# Patient Record
Sex: MALE | Race: WHITE | NOT HISPANIC OR LATINO | Employment: OTHER | URBAN - METROPOLITAN AREA
[De-identification: names, ages, dates, MRNs, and addresses within clinical notes are randomized per-mention and may not be internally consistent; named-entity substitution may affect disease eponyms.]

---

## 2020-10-30 PROBLEM — R97.20 ELEVATED PSA: Status: ACTIVE | Noted: 2020-08-27

## 2021-01-14 ENCOUNTER — ANESTHESIA EVENT (OUTPATIENT)
Dept: PERIOP | Facility: HOSPITAL | Age: 66
End: 2021-01-14
Payer: MEDICARE

## 2021-01-14 DIAGNOSIS — M16.11 PRIMARY OSTEOARTHRITIS OF RIGHT HIP: Primary | ICD-10-CM

## 2021-01-15 DIAGNOSIS — Z11.59 SCREENING FOR VIRAL DISEASE: Primary | ICD-10-CM

## 2021-01-15 DIAGNOSIS — M16.11 PRIMARY OSTEOARTHRITIS OF RIGHT HIP: ICD-10-CM

## 2021-01-15 DIAGNOSIS — Z11.59 SCREENING FOR VIRAL DISEASE: ICD-10-CM

## 2021-01-15 PROCEDURE — U0005 INFEC AGEN DETEC AMPLI PROBE: HCPCS | Performed by: ORTHOPAEDIC SURGERY

## 2021-01-15 PROCEDURE — U0003 INFECTIOUS AGENT DETECTION BY NUCLEIC ACID (DNA OR RNA); SEVERE ACUTE RESPIRATORY SYNDROME CORONAVIRUS 2 (SARS-COV-2) (CORONAVIRUS DISEASE [COVID-19]), AMPLIFIED PROBE TECHNIQUE, MAKING USE OF HIGH THROUGHPUT TECHNOLOGIES AS DESCRIBED BY CMS-2020-01-R: HCPCS | Performed by: ORTHOPAEDIC SURGERY

## 2021-01-15 RX ORDER — LANOLIN ALCOHOL/MO/W.PET/CERES
1 CREAM (GRAM) TOPICAL 2 TIMES DAILY
COMMUNITY
End: 2021-01-21 | Stop reason: HOSPADM

## 2021-01-15 NOTE — PRE-PROCEDURE INSTRUCTIONS
Pre-Surgery Instructions:   Medication Instructions    glucosamine-chondroitin 500-400 MG tablet Instructed patient per Anesthesia Guidelines  Instructed to stop Glucosamine and no aspirin or NSAIDs before surgery starting now  Has no medications to take the morning of surgery

## 2021-01-16 LAB — SARS-COV-2 RNA SPEC QL NAA+PROBE: NOT DETECTED

## 2021-01-20 ENCOUNTER — APPOINTMENT (OUTPATIENT)
Dept: RADIOLOGY | Facility: HOSPITAL | Age: 66
End: 2021-01-20
Payer: MEDICARE

## 2021-01-20 ENCOUNTER — ANESTHESIA (OUTPATIENT)
Dept: PERIOP | Facility: HOSPITAL | Age: 66
End: 2021-01-20
Payer: MEDICARE

## 2021-01-20 ENCOUNTER — HOSPITAL ENCOUNTER (OUTPATIENT)
Facility: HOSPITAL | Age: 66
Setting detail: OUTPATIENT SURGERY
Discharge: HOME HEALTH | End: 2021-01-21
Attending: ORTHOPAEDIC SURGERY | Admitting: ORTHOPAEDIC SURGERY
Payer: MEDICARE

## 2021-01-20 ENCOUNTER — HOSPITAL ENCOUNTER (OUTPATIENT)
Dept: RADIOLOGY | Facility: HOSPITAL | Age: 66
Setting detail: OUTPATIENT SURGERY
Discharge: HOME | End: 2021-01-20
Payer: MEDICARE

## 2021-01-20 VITALS — HEART RATE: 77 BPM

## 2021-01-20 DIAGNOSIS — T83.091A COMPLICATION, BLOCKED FOLEY CATHETER, INITIAL ENCOUNTER (HCC): ICD-10-CM

## 2021-01-20 DIAGNOSIS — M16.11 PRIMARY OSTEOARTHRITIS OF RIGHT HIP: Primary | ICD-10-CM

## 2021-01-20 DIAGNOSIS — M16.11 PRIMARY OSTEOARTHRITIS OF RIGHT HIP: ICD-10-CM

## 2021-01-20 PROBLEM — E66.9 OBESITY (BMI 30.0-34.9): Status: ACTIVE | Noted: 2021-01-20

## 2021-01-20 LAB
ABO GROUP BLD: NORMAL
ABO GROUP BLD: NORMAL
BLD GP AB SCN SERPL QL: NEGATIVE
RH BLD: POSITIVE
RH BLD: POSITIVE
SPECIMEN EXPIRATION DATE: NORMAL

## 2021-01-20 PROCEDURE — 86901 BLOOD TYPING SEROLOGIC RH(D): CPT | Performed by: ORTHOPAEDIC SURGERY

## 2021-01-20 PROCEDURE — C1776 JOINT DEVICE (IMPLANTABLE): HCPCS | Performed by: ORTHOPAEDIC SURGERY

## 2021-01-20 PROCEDURE — 73501 X-RAY EXAM HIP UNI 1 VIEW: CPT

## 2021-01-20 PROCEDURE — 86900 BLOOD TYPING SEROLOGIC ABO: CPT | Performed by: ORTHOPAEDIC SURGERY

## 2021-01-20 PROCEDURE — 51702 INSERT TEMP BLADDER CATH: CPT | Performed by: UROLOGY

## 2021-01-20 PROCEDURE — 99203 OFFICE O/P NEW LOW 30 MIN: CPT | Performed by: UROLOGY

## 2021-01-20 PROCEDURE — 86850 RBC ANTIBODY SCREEN: CPT | Performed by: ORTHOPAEDIC SURGERY

## 2021-01-20 DEVICE — PINNACLE POROCOAT ACETABULAR SHELL SECTOR II 58MM OD
Type: IMPLANTABLE DEVICE | Site: HIP | Status: FUNCTIONAL
Brand: PINNACLE POROCOAT

## 2021-01-20 DEVICE — PINNACLE HIP SOLUTIONS ALTRX POLYETHYLENE ACETABULAR LINER NEUTRAL 36MM ID 58MM OD
Type: IMPLANTABLE DEVICE | Site: HIP | Status: FUNCTIONAL
Brand: PINNACLE ALTRX

## 2021-01-20 DEVICE — BIOLOX DELTA CERAMIC FEMORAL HEAD +5.0 36MM DIA 12/14 TAPER
Type: IMPLANTABLE DEVICE | Site: HIP | Status: FUNCTIONAL
Brand: BIOLOX DELTA

## 2021-01-20 DEVICE — CORAIL HIP SYSTEM CEMENTLESS FEMORAL STEM HA COATED 12/14 AMT 135 DEGREES HIGH OFFSET COLLAR SIZE 14
Type: IMPLANTABLE DEVICE | Site: HIP | Status: FUNCTIONAL
Brand: CORAIL

## 2021-01-20 RX ORDER — OXYCODONE HYDROCHLORIDE 10 MG/1
10 TABLET ORAL EVERY 6 HOURS PRN
Status: DISCONTINUED | OUTPATIENT
Start: 2021-01-20 | End: 2021-01-21 | Stop reason: HOSPADM

## 2021-01-20 RX ORDER — ONDANSETRON 2 MG/ML
4 INJECTION INTRAMUSCULAR; INTRAVENOUS ONCE AS NEEDED
Status: DISCONTINUED | OUTPATIENT
Start: 2021-01-20 | End: 2021-01-20 | Stop reason: HOSPADM

## 2021-01-20 RX ORDER — DOCUSATE SODIUM 100 MG/1
100 CAPSULE, LIQUID FILLED ORAL 2 TIMES DAILY
Status: DISCONTINUED | OUTPATIENT
Start: 2021-01-20 | End: 2021-01-21 | Stop reason: HOSPADM

## 2021-01-20 RX ORDER — CEFAZOLIN SODIUM 1 G/50ML
1000 SOLUTION INTRAVENOUS EVERY 8 HOURS
Status: COMPLETED | OUTPATIENT
Start: 2021-01-20 | End: 2021-01-21

## 2021-01-20 RX ORDER — MIDAZOLAM HYDROCHLORIDE 2 MG/2ML
INJECTION, SOLUTION INTRAMUSCULAR; INTRAVENOUS AS NEEDED
Status: DISCONTINUED | OUTPATIENT
Start: 2021-01-20 | End: 2021-01-20

## 2021-01-20 RX ORDER — SODIUM CHLORIDE 9 MG/ML
100 INJECTION, SOLUTION INTRAVENOUS CONTINUOUS
Status: DISCONTINUED | OUTPATIENT
Start: 2021-01-20 | End: 2021-01-21 | Stop reason: HOSPADM

## 2021-01-20 RX ORDER — ASPIRIN 325 MG
325 TABLET ORAL 2 TIMES DAILY
Status: DISCONTINUED | OUTPATIENT
Start: 2021-01-20 | End: 2021-01-21 | Stop reason: HOSPADM

## 2021-01-20 RX ORDER — GLYCOPYRROLATE 0.2 MG/ML
INJECTION INTRAMUSCULAR; INTRAVENOUS AS NEEDED
Status: DISCONTINUED | OUTPATIENT
Start: 2021-01-20 | End: 2021-01-20

## 2021-01-20 RX ORDER — TRANEXAMIC ACID 100 MG/ML
INJECTION, SOLUTION INTRAVENOUS AS NEEDED
Status: DISCONTINUED | OUTPATIENT
Start: 2021-01-20 | End: 2021-01-20

## 2021-01-20 RX ORDER — EPHEDRINE SULFATE 50 MG/ML
INJECTION INTRAVENOUS AS NEEDED
Status: DISCONTINUED | OUTPATIENT
Start: 2021-01-20 | End: 2021-01-20

## 2021-01-20 RX ORDER — FENTANYL CITRATE 50 UG/ML
INJECTION, SOLUTION INTRAMUSCULAR; INTRAVENOUS AS NEEDED
Status: DISCONTINUED | OUTPATIENT
Start: 2021-01-20 | End: 2021-01-20

## 2021-01-20 RX ORDER — CEFAZOLIN SODIUM 1 G/50ML
1000 SOLUTION INTRAVENOUS ONCE
Status: COMPLETED | OUTPATIENT
Start: 2021-01-20 | End: 2021-01-20

## 2021-01-20 RX ORDER — FERROUS SULFATE 325(65) MG
325 TABLET ORAL
Status: DISCONTINUED | OUTPATIENT
Start: 2021-01-21 | End: 2021-01-21 | Stop reason: HOSPADM

## 2021-01-20 RX ORDER — BUPIVACAINE HYDROCHLORIDE 7.5 MG/ML
INJECTION, SOLUTION INTRASPINAL AS NEEDED
Status: DISCONTINUED | OUTPATIENT
Start: 2021-01-20 | End: 2021-01-20

## 2021-01-20 RX ORDER — DEXAMETHASONE SODIUM PHOSPHATE 4 MG/ML
INJECTION, SOLUTION INTRA-ARTICULAR; INTRALESIONAL; INTRAMUSCULAR; INTRAVENOUS; SOFT TISSUE AS NEEDED
Status: DISCONTINUED | OUTPATIENT
Start: 2021-01-20 | End: 2021-01-20

## 2021-01-20 RX ORDER — MAGNESIUM HYDROXIDE 1200 MG/15ML
LIQUID ORAL AS NEEDED
Status: DISCONTINUED | OUTPATIENT
Start: 2021-01-20 | End: 2021-01-20 | Stop reason: HOSPADM

## 2021-01-20 RX ORDER — OXYCODONE HYDROCHLORIDE 5 MG/1
5 TABLET ORAL EVERY 4 HOURS PRN
Status: DISCONTINUED | OUTPATIENT
Start: 2021-01-20 | End: 2021-01-21 | Stop reason: HOSPADM

## 2021-01-20 RX ORDER — ONDANSETRON 2 MG/ML
4 INJECTION INTRAMUSCULAR; INTRAVENOUS EVERY 6 HOURS PRN
Status: DISCONTINUED | OUTPATIENT
Start: 2021-01-20 | End: 2021-01-21 | Stop reason: HOSPADM

## 2021-01-20 RX ORDER — ACETAMINOPHEN 325 MG/1
650 TABLET ORAL EVERY 6 HOURS PRN
Status: DISCONTINUED | OUTPATIENT
Start: 2021-01-20 | End: 2021-01-21 | Stop reason: HOSPADM

## 2021-01-20 RX ORDER — SODIUM CHLORIDE, SODIUM LACTATE, POTASSIUM CHLORIDE, CALCIUM CHLORIDE 600; 310; 30; 20 MG/100ML; MG/100ML; MG/100ML; MG/100ML
100 INJECTION, SOLUTION INTRAVENOUS CONTINUOUS
Status: DISCONTINUED | OUTPATIENT
Start: 2021-01-20 | End: 2021-01-21 | Stop reason: HOSPADM

## 2021-01-20 RX ORDER — KETOROLAC TROMETHAMINE 30 MG/ML
15 INJECTION, SOLUTION INTRAMUSCULAR; INTRAVENOUS EVERY 6 HOURS PRN
Status: DISCONTINUED | OUTPATIENT
Start: 2021-01-20 | End: 2021-01-21 | Stop reason: HOSPADM

## 2021-01-20 RX ORDER — ONDANSETRON 2 MG/ML
INJECTION INTRAMUSCULAR; INTRAVENOUS AS NEEDED
Status: DISCONTINUED | OUTPATIENT
Start: 2021-01-20 | End: 2021-01-20

## 2021-01-20 RX ORDER — PROPOFOL 10 MG/ML
INJECTION, EMULSION INTRAVENOUS CONTINUOUS PRN
Status: DISCONTINUED | OUTPATIENT
Start: 2021-01-20 | End: 2021-01-20

## 2021-01-20 RX ORDER — FENTANYL CITRATE/PF 50 MCG/ML
25 SYRINGE (ML) INJECTION
Status: DISCONTINUED | OUTPATIENT
Start: 2021-01-20 | End: 2021-01-20 | Stop reason: HOSPADM

## 2021-01-20 RX ORDER — METOCLOPRAMIDE HYDROCHLORIDE 5 MG/ML
INJECTION INTRAMUSCULAR; INTRAVENOUS AS NEEDED
Status: DISCONTINUED | OUTPATIENT
Start: 2021-01-20 | End: 2021-01-20

## 2021-01-20 RX ORDER — SODIUM CHLORIDE, SODIUM LACTATE, POTASSIUM CHLORIDE, CALCIUM CHLORIDE 600; 310; 30; 20 MG/100ML; MG/100ML; MG/100ML; MG/100ML
50 INJECTION, SOLUTION INTRAVENOUS CONTINUOUS
Status: DISCONTINUED | OUTPATIENT
Start: 2021-01-20 | End: 2021-01-21 | Stop reason: HOSPADM

## 2021-01-20 RX ORDER — LIDOCAINE HYDROCHLORIDE 10 MG/ML
0.5 INJECTION, SOLUTION EPIDURAL; INFILTRATION; INTRACAUDAL; PERINEURAL ONCE AS NEEDED
Status: DISCONTINUED | OUTPATIENT
Start: 2021-01-20 | End: 2021-01-20 | Stop reason: HOSPADM

## 2021-01-20 RX ORDER — SENNOSIDES 8.6 MG
1 TABLET ORAL DAILY
Status: DISCONTINUED | OUTPATIENT
Start: 2021-01-21 | End: 2021-01-21 | Stop reason: HOSPADM

## 2021-01-20 RX ADMIN — MIDAZOLAM 2 MG: 1 INJECTION INTRAMUSCULAR; INTRAVENOUS at 13:04

## 2021-01-20 RX ADMIN — SODIUM CHLORIDE: 0.9 INJECTION, SOLUTION INTRAVENOUS at 13:45

## 2021-01-20 RX ADMIN — EPHEDRINE SULFATE 5 MG: 50 INJECTION, SOLUTION INTRAVENOUS at 15:03

## 2021-01-20 RX ADMIN — ASPIRIN 325 MG ORAL TABLET 325 MG: 325 PILL ORAL at 20:31

## 2021-01-20 RX ADMIN — FENTANYL CITRATE 100 MCG: 50 INJECTION, SOLUTION INTRAMUSCULAR; INTRAVENOUS at 13:04

## 2021-01-20 RX ADMIN — DEXAMETHASONE SODIUM PHOSPHATE 4 MG: 4 INJECTION INTRA-ARTICULAR; INTRALESIONAL; INTRAMUSCULAR; INTRAVENOUS; SOFT TISSUE at 13:58

## 2021-01-20 RX ADMIN — SODIUM CHLORIDE 100 ML/HR: 0.9 INJECTION, SOLUTION INTRAVENOUS at 10:59

## 2021-01-20 RX ADMIN — TRANEXAMIC ACID 1000 MG: 1 INJECTION, SOLUTION INTRAVENOUS at 13:26

## 2021-01-20 RX ADMIN — SODIUM CHLORIDE, SODIUM LACTATE, POTASSIUM CHLORIDE, AND CALCIUM CHLORIDE 100 ML/HR: .6; .31; .03; .02 INJECTION, SOLUTION INTRAVENOUS at 23:47

## 2021-01-20 RX ADMIN — SODIUM CHLORIDE, SODIUM LACTATE, POTASSIUM CHLORIDE, AND CALCIUM CHLORIDE 100 ML/HR: .6; .31; .03; .02 INJECTION, SOLUTION INTRAVENOUS at 21:00

## 2021-01-20 RX ADMIN — CEFAZOLIN SODIUM 1000 MG: 1 SOLUTION INTRAVENOUS at 22:31

## 2021-01-20 RX ADMIN — EPHEDRINE SULFATE 5 MG: 50 INJECTION, SOLUTION INTRAVENOUS at 13:55

## 2021-01-20 RX ADMIN — GLYCOPYRROLATE 0.2 MG: 0.2 INJECTION, SOLUTION INTRAMUSCULAR; INTRAVENOUS at 13:48

## 2021-01-20 RX ADMIN — BUPIVACAINE HYDROCHLORIDE IN DEXTROSE 2 ML: 7.5 INJECTION, SOLUTION SUBARACHNOID at 13:17

## 2021-01-20 RX ADMIN — CEFAZOLIN SODIUM 2000 MG: 1 SOLUTION INTRAVENOUS at 13:00

## 2021-01-20 RX ADMIN — METOCLOPRAMIDE 5 MG: 5 INJECTION, SOLUTION INTRAMUSCULAR; INTRAVENOUS at 13:48

## 2021-01-20 RX ADMIN — ONDANSETRON 4 MG: 2 INJECTION INTRAMUSCULAR; INTRAVENOUS at 14:59

## 2021-01-20 RX ADMIN — PROPOFOL 150 MCG/KG/MIN: 10 INJECTION, EMULSION INTRAVENOUS at 13:36

## 2021-01-20 RX ADMIN — EPHEDRINE SULFATE 10 MG: 50 INJECTION, SOLUTION INTRAVENOUS at 14:05

## 2021-01-20 RX ADMIN — EPHEDRINE SULFATE 10 MG: 50 INJECTION, SOLUTION INTRAVENOUS at 14:33

## 2021-01-20 RX ADMIN — EPHEDRINE SULFATE 5 MG: 50 INJECTION, SOLUTION INTRAVENOUS at 14:00

## 2021-01-20 NOTE — OP NOTE
Right Anterior Total Hip Procedure Note    Patient Name: Andrew Owen  MRN: [id number]  Date of Surgery 1/20/2021        Indications: Degenerative joint disease right hip with failed conservative care  Pre-operative Diagnosis: Right hip degenerative joint disease  Post-operative Diagnosis: Right hip degenerative joint disease  Surgeon: Fabienne Early MD [facility]    Assistant: Wali Calvillo [facility]    Anesthesia:  Procedure(s) and Anesthesia Type:     * ARTHROPLASTY HIP TOTAL ANTERIOR - Choice  Operative procedure: Right total hip arthroplasty, anterior approach    Implants:   Implant Name Type Inv  Item Serial No   Lot No  LRB No  Used Action   SHELL ACETABULAR 58MM POROUS SOLID LCK RING PINNACLE - [id number]  SHELL ACETABULAR 58MM POROUS SOLID LCK RING PINNACLE  DEPUY [id number] Right 1 Implanted   LINER ACTB ULT LNK PE 36 X 58MM 0DEG NEUTRAL ALTRX - [id number]  LINER ACTB ULT LNK PE 36 X 58MM 0DEG NEUTRAL ALTRX  DEPUY [id number] Right 1 Implanted   STEM FEMORAL SZ 14 HI COL CONRAIL AMT - [id number]  STEM FEMORAL SZ 14 HI COL CONRAIL AMT  DEPUY [id number] Right 1 Implanted   HEAD FEMORAL CMNTLS 12/14 TPR 36MM +5MM BIOLOX DELTA ARTICULEZE - [id number]  HEAD FEMORAL CMNTLS 12/14 TPR 36MM +5MM BIOLOX DELTA ARTICULEZE  DEPUY [id number] Right 1 Implanted     Ceramic head on Polyethelene, cementless    Drains: None  Estimated blood loss: 100cc    Antibiotics: Given preoperatively    Clinical note: Andrew Owen is a 77 y o  male who presents with severe right hip pain and disability  Physical exam investigations was consistent with degenerative joint disease  The patient been treated nonoperatively and failed to improve  Nonoperative versus operative options reviewed  The patient did understand the situation and did wish to proceed with operative management    Description of procedure: The patient was identified as Andrew Owen and the right hip as the surgical site    Anesthesia was administered  The patient was appropriately padded and placed on the [place] table for hip surgery  Utilizing a anterior approach, sharp dissection was carried down through skin  Hemostasis was obtained using electrocautery followed by division of the fascia overlying tensor fascia katlin  The tensor fascia katlin and abductors were retracted laterally  The lateral femoral circumflex vessels were identified and electrocoagulated  The hip capsule was identified and appropriate retractors were placed  A capsulotomy was performed and end-stage degenerative changes within the hip joint were confirmed  Under fluoroscopic control, a femoral neck cut was made and the femoral head and neck was removed  Loss of articular cartilage with osteophyte formation was again confirmed  Under direct vision as well as with the aid of fluoroscopic control, the acetabulum was serially reamed to a bleeding bony bed  Prominent osteophytes were removed  Under fluoroscopic control a [place] sector acetabulum was then impacted in place with approximately 40° of abduction and 20° of anteversion  A neutral polyethylene liner was then impacted into the acetabular shell with good fixation  Appropriate retractors were placed along the proximal femur and appropriate soft tissue releases were performed  The femur was elevated out of the wound to gain access to the femoral canal   The femoral canal was opened up with a box osteotome and rongeur followed by appropriate rasp and broaches  A trial reduction was carried out and the appropriate head and neck size was identified both under direct vision as well as with fluoroscopic imaging  The hip was noted to be stable with extreme positioning  The trial components were removed at the appropriate time after copious irrigation of the wound, the final femoral stem was impacted in place with excellent fixation    Trial reduction with various head sizes was carried out and the appropriate head size selected and impacted on the [facility] FOR CONTINUING [facility] CARE TABBY taper  The hip was reduced for the last time again demonstrating excellent range of motion stability with leg lengths being estimated to be near equal   Final imaging was obtained with the fluoroscopy and kept his hard copy  Hemostasis was inspected and found to be satisfactory followed by irrigation with pulse lavage and closure utilizing 2-0 Vicryl and stratafix for deep layers, 2-0 Vicryl  For subcutaneous closure and a 3 Monocryl subcutaneous stitch for skin  Steri-Strips were applied  A soft absorbent bandage was added and the patient was then transferred to the stretcher in the supine position  There were no complications  Throughout the procedure, assistance by Fifi Wang PA-C was required  She was required to aid in positioning the patient preoperatively and intraoperatively she was required to manipulate the patient's right lower extremity as well as various retractors and other equipment under my guidance  On completion of procedure, she was required to aid in closure of the wound and application of bandage  She was also required to aid in transfer the patient to recovery  AP hip, AP pelvis and and lateral views of the right hip were obtained intraoperatively  This demonstrated appropriate positioning a right total hip arthroplasty components  There was no evidence loosening or failure  There was air in the soft tissues consistent with intraoperative status the radiographs        Date: 1/20/2021  Time: 3:45 PM    Carmita Chew MD [facility]

## 2021-01-20 NOTE — ANESTHESIA PREPROCEDURE EVALUATION
Procedure:  ARTHROPLASTY HIP TOTAL ANTERIOR (Right Hip)    Relevant Problems   MUSCULOSKELETAL   (+) Primary localized osteoarthritis of right hip      Other   (+) Elevated PSA   (+) Obesity (BMI 30 0-34  9)        Physical Exam    Airway    Mallampati score: II  TM Distance: >3 FB  Neck ROM: full     Dental       Cardiovascular  Rhythm: regular, Rate: normal, Cardiovascular exam normal    Pulmonary  Pulmonary exam normal     Other Findings        Anesthesia Plan  ASA Score- 2     Anesthesia Type- spinal with ASA Monitors  Additional Monitors:   Airway Plan:           Plan Factors-    Chart reviewed  EKG reviewed  Existing labs reviewed  Patient summary reviewed  Patient is not a current smoker  Patient instructed to abstain from smoking on day of procedure  Patient did not smoke on day of surgery  Induction- intravenous  Postoperative Plan-     Informed Consent- Anesthetic plan and risks discussed with patient

## 2021-01-20 NOTE — INTERVAL H&P NOTE
H&P reviewed  After examining the patient I find no changes in the patients condition since the H&P had been written      Vitals:    01/20/21 1029   BP: 128/78   Pulse:    Resp:    Temp:    SpO2:

## 2021-01-20 NOTE — ANESTHESIA POSTPROCEDURE EVALUATION
Post-Op Assessment Note    CV Status:  Stable    Pain management: adequate     Mental Status:  Alert and awake   Hydration Status:  Euvolemic   PONV Controlled:  Controlled   Airway Patency:  Patent      Post Op Vitals Reviewed: Yes      Staff: Anesthesiologist         No complications documented      BP      Temp      Pulse    Resp      SpO2

## 2021-01-20 NOTE — CONSULTS
Consult - Urology   Hawk Span 1955, 77 y o  male MRN: [id number]    Unit/Bed#: OR POOL Encounter: [id number]  Assessment & Plan  POD#0 Right total hip arthroplasty  Fernando catheter placed intraoperatively around 1330 for no urine return, removed postop 1545 for still no UOP  Bladder scan current 94ml  Recommend Fernando catheter placement in light of spinal anesthesia, can remove tomorrow AM after anesthesia effects have worn off and he is OOB with PT  16Fr Fernando catheter placed at bedside in usual sterile fashion for clear yellow urine return about 300ml  Tolerated well, no procedural complications or altered anatomy encountered  Urology will sign off but remain available for any further inpatient needs  Please feel free to contact the provider [facility] with questions or concerns  Subjective: no complaints  No penile or bladder pain or fullness  Had spinal anesthesia so can't really feel anything  Denies prior  issues or catheterizations  Does follow with [facility] urology for elevated PSA few years, reports normal DREs, and record review shows a normal MRI last week- BPH without focal lesion, pirads 2  Review of Systems   Constitutional: Negative for activity change, appetite change, chills, fever and unexpected weight change  HENT: Negative  Respiratory: Negative  Negative for shortness of breath  Cardiovascular: Negative  Negative for chest pain  Gastrointestinal: Negative for abdominal pain, diarrhea, nausea and vomiting  Endocrine: Negative  Genitourinary: Negative for decreased urine volume, difficulty urinating, dysuria, flank pain, frequency, hematuria, penile pain, testicular pain and urgency  Musculoskeletal: Positive for gait problem (POD#0 THR)  Negative for back pain  Skin: Negative  Allergic/Immunologic: Negative  Hematological: Negative for adenopathy  Does not bruise/bleed easily  Objective:  Vitals: Blood pressure 116/64, pulse (!) 54, temperature 97 7 °F (36 5 °C), resp  rate 20, height 5' 11" (1 803 m), weight 108 kg (238 lb), SpO2 97 %  ,Body mass index is 33 19 kg/m²  Physical Exam  Vitals signs and nursing note reviewed  Constitutional:       General: He is not in acute distress  Appearance: He is well-developed  He is not ill-appearing  HENT:      Head: Normocephalic and atraumatic  Cardiovascular:      Rate and Rhythm: Normal rate and regular rhythm  Heart sounds: Normal heart sounds  No murmur  Pulmonary:      Effort: Pulmonary effort is normal       Breath sounds: Normal breath sounds  Abdominal:      General: Bowel sounds are normal  There is no distension  Palpations: Abdomen is soft  Tenderness: There is no abdominal tenderness  Genitourinary:     Comments: Circumcised penis, normal phallus, orthotopic patent meatus  Testes smooth descended bilaterally into the scrotum nontender with no palpable mass  Musculoskeletal: Normal range of motion  Skin:     General: Skin is warm and dry  Capillary Refill: Capillary refill takes less than 2 seconds  Coloration: Skin is not pale  Neurological:      Mental Status: He is alert and oriented to person, place, and time  Labs:  No results for input(s): WBC in the last 72 hours  No results for input(s): HGB in the last 72 hours  No results for input(s): CREATININE in the last 72 hours        History:    Past Medical History:   Diagnosis Date    Arthritis     b/l hips     R THR today 1/20/2021     Past Surgical History:   Procedure Laterality Date    COLONOSCOPY  11/2015    diverticulosis    COLONOSCOPY       Family History   Problem Relation Age of Onset    Colon cancer Father         age 80       Brionna Thomas  Date: 1/20/2021 Time: 5:49 PM

## 2021-01-20 NOTE — ANESTHESIA PROCEDURE NOTES
Spinal Block    Patient location during procedure: OR  Start time: 1/20/2021 1:17 PM  Staffing  Resident/CRNA: Kalpesh Juarez CRNA  Performed: resident/CRNA   Preanesthetic Checklist  Completed: patient identified, site marked, surgical consent, pre-op evaluation, timeout performed, IV checked, risks and benefits discussed and monitors and equipment checked  Spinal Block  Patient position: sitting  Prep: Betadine  Patient monitoring: heart rate, continuous pulse ox and frequent blood pressure checks  Approach: midline  Location: L3-4  Injection technique: single-shot  Needle  Needle type: pencil-tip   Needle gauge: 24 G  Needle length: 10 cm  Assessment  Sensory level: T10  Events: cerebrospinal fluid  Injection Assessment:  positive aspiration for clear CSF, no paresthesia on injection and negative aspiration for heme

## 2021-01-21 VITALS
DIASTOLIC BLOOD PRESSURE: 61 MMHG | RESPIRATION RATE: 18 BRPM | BODY MASS INDEX: 33.32 KG/M2 | HEIGHT: 71 IN | SYSTOLIC BLOOD PRESSURE: 111 MMHG | WEIGHT: 238 LBS | HEART RATE: 65 BPM | TEMPERATURE: 97.2 F | OXYGEN SATURATION: 96 %

## 2021-01-21 LAB
ANION GAP SERPL CALCULATED.3IONS-SCNC: 10 MMOL/L (ref 4–13)
BUN SERPL-MCNC: 11 MG/DL (ref 5–25)
CALCIUM SERPL-MCNC: 8.4 MG/DL (ref 8.3–10.1)
CHLORIDE SERPL-SCNC: 105 MMOL/L (ref 100–108)
CO2 SERPL-SCNC: 23 MMOL/L (ref 21–32)
CREAT SERPL-MCNC: 0.89 MG/DL (ref 0.6–1.3)
ERYTHROCYTE [DISTWIDTH] IN BLOOD BY AUTOMATED COUNT: 13.6 % (ref 11.6–15.1)
GFR SERPL CREATININE-BSD FRML MDRD: 88 ML/MIN/1.73SQ M
GLUCOSE P FAST SERPL-MCNC: 103 MG/DL (ref 65–99)
GLUCOSE SERPL-MCNC: 103 MG/DL (ref 65–140)
HCT VFR BLD AUTO: 40.2 % (ref 36.5–49.3)
HGB BLD-MCNC: 13 G/DL (ref 12–17)
MCH RBC QN AUTO: 29 PG (ref 26.8–34.3)
MCHC RBC AUTO-ENTMCNC: 32.3 G/DL (ref 31.4–37.4)
MCV RBC AUTO: 90 FL (ref 82–98)
PLATELET # BLD AUTO: 184 THOUSANDS/UL (ref 149–390)
PMV BLD AUTO: 10.2 FL (ref 8.9–12.7)
POTASSIUM SERPL-SCNC: 4 MMOL/L (ref 3.5–5.3)
RBC # BLD AUTO: 4.49 MILLION/UL (ref 3.88–5.62)
SODIUM SERPL-SCNC: 138 MMOL/L (ref 136–145)
WBC # BLD AUTO: 10.8 THOUSAND/UL (ref 4.31–10.16)

## 2021-01-21 PROCEDURE — 80048 BASIC METABOLIC PNL TOTAL CA: CPT | Performed by: PHYSICIAN ASSISTANT

## 2021-01-21 PROCEDURE — 97163 PT EVAL HIGH COMPLEX 45 MIN: CPT

## 2021-01-21 PROCEDURE — 85027 COMPLETE CBC AUTOMATED: CPT | Performed by: PHYSICIAN ASSISTANT

## 2021-01-21 PROCEDURE — 97116 GAIT TRAINING THERAPY: CPT

## 2021-01-21 PROCEDURE — 97530 THERAPEUTIC ACTIVITIES: CPT

## 2021-01-21 PROCEDURE — 97166 OT EVAL MOD COMPLEX 45 MIN: CPT

## 2021-01-21 PROCEDURE — 97110 THERAPEUTIC EXERCISES: CPT

## 2021-01-21 RX ORDER — SENNOSIDES 8.6 MG
8.6 TABLET ORAL DAILY
Refills: 0
Start: 2021-01-21

## 2021-01-21 RX ORDER — DOCUSATE SODIUM 100 MG/1
100 CAPSULE, LIQUID FILLED ORAL 2 TIMES DAILY
Qty: 10 CAPSULE | Refills: 0
Start: 2021-01-21

## 2021-01-21 RX ORDER — ASPIRIN 325 MG
325 TABLET ORAL 2 TIMES DAILY
Qty: 41 TABLET | Refills: 0
Start: 2021-01-21 | End: 2021-02-11

## 2021-01-21 RX ORDER — FERROUS SULFATE 325(65) MG
325 TABLET ORAL
Refills: 0
Start: 2021-01-21

## 2021-01-21 RX ORDER — OXYCODONE HYDROCHLORIDE 5 MG/1
5 TABLET ORAL EVERY 4 HOURS PRN
Qty: 30 TABLET | Refills: 0
Start: 2021-01-21 | End: 2021-01-31

## 2021-01-21 RX ORDER — ACETAMINOPHEN 325 MG/1
650 TABLET ORAL EVERY 6 HOURS PRN
Refills: 0
Start: 2021-01-21

## 2021-01-21 RX ADMIN — FERROUS SULFATE TAB 325 MG (65 MG ELEMENTAL FE) 325 MG: 325 (65 FE) TAB at 09:56

## 2021-01-21 RX ADMIN — SENNOSIDES 8.6 MG: 8.6 TABLET ORAL at 09:56

## 2021-01-21 RX ADMIN — ASPIRIN 325 MG ORAL TABLET 325 MG: 325 PILL ORAL at 09:56

## 2021-01-21 RX ADMIN — ACETAMINOPHEN 650 MG: 325 TABLET ORAL at 09:57

## 2021-01-21 RX ADMIN — CEFAZOLIN SODIUM 1000 MG: 1 SOLUTION INTRAVENOUS at 05:00

## 2021-01-21 RX ADMIN — DOCUSATE SODIUM 100 MG: 100 CAPSULE, LIQUID FILLED ORAL at 09:56

## 2021-01-21 NOTE — PHYSICAL THERAPY NOTE
Physical Therapy Progress Note     01/21/21 1508   Note Type   Note Type Treatment   Pain Assessment   Pain Assessment Tool 0-10   Pain Score 2   Pain Location/Orientation Orientation: Right;Location: Hip   Hospital Pain Intervention(s) Ambulation/increased activity;Cold applied;Repositioned   Restrictions/Precautions   Weight Bearing Precautions Per Order Yes   RLE Weight Bearing Per Order WBAT   Other Precautions Fall Risk;Pain   General   Chart Reviewed Yes   Response to Previous Treatment Patient with no complaints from previous session  Family/Caregiver Present No   Subjective   Subjective Willing to participate in therapy this PM    Bed Mobility   Supine to Sit 5  Supervision   Additional items Assist x 1;HOB elevated; Bedrails;Leg ; Increased time required;Verbal cues;LE management   Sit to Supine 5  Supervision   Additional items Assist x 1;Bedrails;Leg ; Increased time required;Verbal cues;LE management   Transfers   Sit to Stand 5  Supervision   Additional items Assist x 1;Bedrails; Increased time required;Verbal cues   Stand to Sit 5  Supervision   Additional items Assist x 1;Bedrails; Increased time required;Verbal cues   Ambulation/Elevation   Gait pattern Decreased foot clearance; Forward Flexion; Short stride; Excessively slow; Inconsistent rosi;Decreased R stance; Antalgic; Improper Weight shift  (step through gait pattern)   Gait Assistance 5  Supervision   Additional items Assist x 1;Verbal cues; Tactile cues   Assistive Device Rolling walker   Distance 150' x 2 with stair training in between   Stair Management Assistance 5  Supervision   Additional items Assist x 1;Verbal cues; Tactile cues   Stair Management Technique Two rails; Step to pattern; Foreward;Nonreciprocal   Number of Stairs 10   Balance   Static Sitting Good   Dynamic Sitting Fair +   Static Standing Fair   Dynamic Standing Fair   Ambulatory Fair -   Endurance Deficit   Endurance Deficit No   Activity Tolerance   Activity Tolerance Patient tolerated treatment well   Nurse Made Aware Yes   Exercises   THR Sitting;Supine;10 reps;AAROM; Bilateral   Assessment   Prognosis Good   Problem List Decreased strength;Decreased range of motion;Decreased endurance; Impaired balance;Decreased mobility; Decreased skin integrity;Orthopedic restrictions;Pain   Assessment Pt  supine in bed upon my arrival  Pt  reporting fatigue/pain, however agreeable to therapeutic intervention  Performance of HEP with cues provided for proper completion  Progressed with transfers being able to complete practicing proper technique with no noted LOB  Progressed with increased amb  trial with use of RW and cues provided for LE sequencing  Progressed to stair training, being able to complete practicing proper technique with no noted LOB  Pt  reports good understanding at this time with no questions at this time  Continued with a second amb  trial with return to room and repositioned supine in bed with ice applied at end of treatment session  Pt  has completed all his PT goals and is safe for d/c home with HHPT and family support as needed when medically stable for continued improvement of noted impairments above  Barriers to Discharge None   Goals   Patient Goals To go home today  STG Expiration Date 02/04/21   PT Treatment Day 1   Plan   Treatment/Interventions Functional transfer training;LE strengthening/ROM; Therapeutic exercise;Elevations; Endurance training;Bed mobility;Gait training;Spoke to nursing;Spoke to case management   Progress Progressing toward goals   PT Frequency 7x/wk; Twice a day;Weekend   Recommendation   PT Discharge Recommendation Home with skilled therapy; Return to previous environment with social support  (HHPT)   Equipment Recommended Walker  (SANTANA, BSJULIAN)   PT - OK to Discharge Yes  (if d/c when medically stable )     Jocelyn Closs, PTA

## 2021-01-21 NOTE — CONSULTS
Consultation - Internal Medicine  Marc Brennan 77 y o  male MRN: [id number]  Unit/Bed#: E2 -01 Encounter: [id number]      Impression :-    This is a very delightful  77 y o  male patient, who has undergone elective right total hip replacement earlier today by Dr Zhane Reynolds  Advanced degenerative joint disease, failed conservative treatments  Ambulatory dysfunction  Postoperative indwelling Fernando catheter  History of elevated PSA/ benign prostatic hypertrophy  Obesity  Underlying pulmonary nodules B/L -following Dr Brady Addison from [address]   Right bundle branch block  Recent positive COVID serology-likely due to COVID infection       Recommendation: -    Patient is currently recuperating well following his surgery  His pain is well controlled with current medications  I have reviewed patients medications as initiated on post operative orders by the primary team  Recommend  monitoring closely for any hypoxemia / respiratory insufficieny related to narcotics and to reduce doses and frequency of narcotics if necessary  Patient had some difficulty with Fernando catheter placement and reviewed urology evaluation  Patient indicates that he has been followed by Dr Latanya Rodríguez at [facility]   He had MRI done with regards to his elevated PSA which he reports was within normal limit  Patient will continue follow-up with his urologist and will have his Fernando catheter removed as per consulting urologist   He will continue follow-up with his Infectious Disease team with regards to his multiple lung nodules which reportedly were biopsied and were noted to be necrotic/infarcted surrounded by fibrotic capsule  Maintain Intravenous Fluids for next 24 -48 hours, till patient able to reliably keep meals and meds in  Suggest  Zofran ODT 4 mg sublingually PRN for control of post operative nausea   Patient encouraged to  use Incentive spirometry q 15 minutes while awake to minimize risk of postoperative atelectasis and pneumonia  Patient verbalized to understand and fully comprehend  Recommend DVT prophylaxis with use of Venodyne compression boots  If any factor X A inhibitor,  low molecule weight heparin or Coumadin is planned by the primary team, we will be happy to dose that  drug based on patient's hemostasis  Maintain ASA as antiplatelet drug per Ortho  Team  Use Proton Pump inhibitor to minimize risk of gastritis  Monitor for any tinnitus as an early sign of salicylism and check salicylate levels in that situation  We will follow this pleasant patient with your service closely and recommend necessary changes based on  further hospital course and diagnostics  Thank you, Dr Bobby Chau , for your kind consultation  HISTORY OF PRESENT ILLNESS:    Reason for Consult:  Post operative management following elective hip replacement  HPI: Vanessa Hansen is a 77 y o  male, semi retired  who has suffered with chronic pain in his right hip  He was having increasing discomfort with simple activities like weight-bearing walking and climbing steps  He was worked up as outpatient and was found to have advanced osteoarthritis both clinically and radiographically  Pain probably has been ongoing for last year and a half but has been more progressive for last 3-4 months  Initially pain was relieved with pain medications and avoiding standing etc   As the time progressed neither the pain medication either any changes to his activities would make a difference  He had imaging studies which confirmed his advanced osteoarthritis    While he was awaiting surgery he underwent a preoperative diagnostic testing including chest x-ray followed by CT scan where he was found to have pulmonary lesions and they were biopsied and he was sent to pulmonologist and infectious disease specialist at [facility]   Workup which was done reportedly was nonspecific and was deemed possibly secondary to a recent COVID infection which she had in October of 2020  Patient fortunately has no pulmonary symptoms of cough dyspnea chest pain etc   Currently is recuperating well  His pain in his operated hip is well controlled with pain medications  He has no nausea vomiting  Patient did have difficulty with Fernando catheter placement which was eventually placed by Urology, reviewed urology consultation  Patient indicates that he has history of longstanding PSA elevation and has been followed by Dr Jed Lazo as outpatient  Review of Systems   Constitutional:  No chills, diaphoresis, fatigue or fever  HEENT:  Negative for congestion, sore throat and tinnitus  No visual complaints  Respiratory:  Negative cough, chest tightness, shortness of breath and wheezing  History of pulmonary nodules noted on his preoperative workup for which he was extensively worked up by Pulmonary and Infectious Disease and this was reviewed in detail  Cardiovascular:  Negative for chest pain and palpitations  Gastrointestinal: Negative for abdominal distention or pain, constipation, diarrhea and nausea  Endocrine: Negative for cold intolerance, heat intolerance, polydipsia and polyuria  Genitourinary:                Negative for  dysuria, flank pain  Tolerating Fernando without difficulty  Skin:   Negative for color change, pallor and rash  Neurological:   Negative for dizziness, tremors, seizures, syncope, facial asymmetry,   speech difficulty, weakness, light-headedness, numbness and headaches  Hematological:  Musculoskeletal:  Psychiatric:  No h/o spontaneous bruising/bleeding  Joint pains as above  Negative for agitation, behavioral problems, confusion, decreased concentration, dysphoric mood and sleep disturbance  A complete system-based review of systems as discussed with patient is otherwise negative      PAST MEDICAL HISTORY:  Past Medical History:   Diagnosis Date    Arthritis     b/l hips     R THR today 1/20/2021     Past Surgical History:   Procedure Laterality Date    COLONOSCOPY  11/2015    diverticulosis    COLONOSCOPY         FAMILY HISTORY:  Non-contributory    SOCIAL HISTORY:  Social History     Substance and Sexual Activity   Alcohol Use Yes    Alcohol/week: 1 0 standard drinks    Types: 1 Glasses of wine per week    Frequency: Monthly or less    Drinks per session: 1 or 2    Binge frequency: Never     Social History     Substance and Sexual Activity   Drug Use Never     Social History     Tobacco Use   Smoking Status Never Smoker   Smokeless Tobacco Never Used       ALLERGIES:  No Known Allergies    MEDICATIONS:  All current active medications have been reviewed    Current Facility-Administered Medications   Medication Dose Route Frequency Provider Last Rate Last Admin    acetaminophen (TYLENOL) tablet 650 mg  650 mg Oral Q6H PRN Katerina Trevino PA-C        aspirin tablet 325 mg  325 mg Oral BID Katerina Trevino PA-C        ceFAZolin (ANCEF) IVPB (premix in dextrose) 1,000 mg 50 mL  1,000 mg Intravenous Q8H Katerina Trevino PA-C        docusate sodium (COLACE) capsule 100 mg  100 mg Oral BID Katerina Trevino PA-C        [START ON 1/21/2021] ferrous sulfate tablet 325 mg  325 mg Oral Daily With Breakfast Katerina Trevino PA-C        ketorolac (TORADOL) injection 15 mg  15 mg Intravenous Q6H PRN Katerina Trevino PA-C        lactated ringers bolus 1,000 mL  1,000 mL Intravenous Once PRN Lucie Saul MD        And    lactated ringers bolus 1,000 mL  1,000 mL Intravenous Once PRN Lucie Saul MD        lactated ringers infusion  50 mL/hr Intravenous Continuous Denver Brown MD        lactated ringers infusion  100 mL/hr Intravenous Continuous Katerina Trevino PA-C        ondansetron [facility]) injection 4 mg  4 mg Intravenous Q6H PRN Katerina Trevino PA-C        oxyCODONE (ROXICODONE) immediate release tablet 10 mg  10 mg Oral Q6H PRN Katerina Trevino PA-C       [facility] oxyCODONE (ROXICODONE) IR tablet 5 mg  5 mg Oral Q4H PRN Kaleb Quan PA-C        [START ON 2021] senna (SENOKOT) tablet 8 6 mg  1 tablet Oral Daily Kaleb Quan PA-C        sodium chloride 0 9 % bolus 1,000 mL  1,000 mL Intravenous Once PRN Harry Herr MD        And    sodium chloride 0 9 % bolus 1,000 mL  1,000 mL Intravenous Once PRN Harry Herr MD        sodium chloride 0 9 % infusion  100 mL/hr Intravenous Continuous Harry Herr  mL/hr at 21 1059 New Bag at 21 1345       Medications Prior to Admission   Medication    glucosamine-chondroitin 500-400 MG tablet           PHYSICAL EXAM:    Vitals:  Temp:  [97 °F (36 1 °C)-98 5 °F (36 9 °C)] 97 °F (36 1 °C)  HR:  [46-81] 60  Resp:  [17-20] 18  BP: (102-128)/(54-78) 124/74  SpO2:  [95 %-98 %] 96 %  Temp (24hrs), Av 8 °F (36 6 °C), Min:97 °F (36 1 °C), Max:98 5 °F (36 9 °C)  Current: Temperature: (!) 97 °F (36 1 °C)  Body mass index is 33 19 kg/m²  General Appearance:    Awake, alert, cooperative, appears of stated age  Resting comfortably and watching TV  Head:    Normocephalic, without obvious abnormality, atraumatic   Eyes:    Vision appears normal without any eye redness or discharge  Ears:    Hearing is not impaired   Nose:   No evidence of epistaxis/ discharge from nares  Throat:   Lips, mucosa, and tongue normal/ normal hydration  Neck:   Supple, symmetrical, trachea midline, no JVP  Lungs:     Bilateral air entry is broncho-alveolar and equal        No crepitation or rales  No pleural rub  Heart:    Regular rate and rhythm, S1S2 normal, no murmur, rub  Abdomen:     Soft, non-tender, no masses, no organomegaly   Genitalia:   Indwelling Fernando catheter  Clear urine +, No hematuria  Musculoskeletal:   Extremities appear normal, atraumatic, no cyanosis or edema  Surgical site over the operated right hip site has clear dressing / no bleeding or hemorrhage apparent     Vascular:   2+ in Posterior tibialis / dorsalis pedis  Skin:   Skin color, texture, turgor normal, no rashes or lesions   Neurologic:   Oriented/ Awake/ facial symmetry maintained  Speech is intact  Muscle bulk and strength is equivocal in B/L Upper and lower extremities except on operated right lower limb  Light sensation is intact B/l LE  B/L [place] flexion is WNL  LABS, IMAGING, & OTHER STUDIES:  Lab Results:  I have personally reviewed pertinent labs  As done at [facility] and noted in his infectious disease workup  Imaging Studies:   I have personally reviewed pertinent imaging study reports  Imaging: Outpatient CT scans x-rays preoperatively which were done were significant for pulmonary nodules, interestingly he had extensive workup which was done through Infectious Disease and Pulmonary Service  EKG, Pathology, and Other Studies:   I have personally reviewed pertinent reports  Portions of the record may have been created with voice recognition software  Occasional wrong word or "sound a like" substitutions may have occurred due to the inherent limitations of voice recognition software  Read the chart carefully and recognize, using context, where substitutions have occurred

## 2021-01-21 NOTE — PLAN OF CARE
Problem: PAIN - ADULT  Goal: Verbalizes/displays adequate comfort level or baseline comfort level  Description: Interventions:  - Encourage patient to monitor pain and request assistance  - Assess pain using appropriate pain scale  - Administer analgesics based on type and severity of pain and evaluate response  - Implement non-pharmacological measures as appropriate and evaluate response  - Consider cultural and social influences on pain and pain management  - Notify physician/advanced practitioner if interventions unsuccessful or patient reports new pain  Outcome: Progressing     Problem: INFECTION - ADULT  Goal: Absence or prevention of progression during hospitalization  Description: INTERVENTIONS:  - Assess and monitor for signs and symptoms of infection  - Monitor lab/diagnostic results  - Monitor all insertion sites, i e  indwelling lines, tubes, and drains  - Monitor endotracheal if appropriate and nasal secretions for changes in amount and color  - Fox appropriate cooling/warming therapies per order  - Administer medications as ordered  - Instruct and encourage patient and family to use good hand hygiene technique  - Identify and instruct in appropriate isolation precautions for identified infection/condition  Outcome: Progressing  Goal: Absence of fever/infection during neutropenic period  Description: INTERVENTIONS:  - Monitor WBC    Outcome: Progressing     Problem: SAFETY ADULT  Goal: Patient will remain free of falls  Description: INTERVENTIONS:  - Assess patient frequently for physical needs  -  Identify cognitive and physical deficits and behaviors that affect risk of falls    -  Fox fall precautions as indicated by assessment   - Educate patient/family on patient safety including physical limitations  - Instruct patient to call for assistance with activity based on assessment  - Modify environment to reduce risk of injury  - Consider OT/PT consult to assist with strengthening/mobility  Outcome: Progressing  Goal: Maintain or return to baseline ADL function  Description: INTERVENTIONS:  -  Assess patient's ability to carry out ADLs; assess patient's baseline for ADL function and identify physical deficits which impact ability to perform ADLs (bathing, care of mouth/teeth, toileting, grooming, dressing, etc )  - Assess/evaluate cause of self-care deficits   - Assess range of motion  - Assess patient's mobility; develop plan if impaired  - Assess patient's need for assistive devices and provide as appropriate  - Encourage maximum independence but intervene and supervise when necessary  - Involve family in performance of ADLs  - Assess for home care needs following discharge   - Consider OT consult to assist with ADL evaluation and planning for discharge  - Provide patient education as appropriate  Outcome: Progressing  Goal: Maintain or return mobility status to optimal level  Description: INTERVENTIONS:  - Assess patient's baseline mobility status (ambulation, transfers, stairs, etc )    - Identify cognitive and physical deficits and behaviors that affect mobility  - Identify mobility aids required to assist with transfers and/or ambulation (gait belt, sit-to-stand, lift, walker, cane, etc )  - [place] fall precautions as indicated by assessment  - Record patient progress and toleration of activity level on Mobility SBAR; progress patient to next Phase/Stage  - Instruct patient to call for assistance with activity based on assessment  - Consider rehabilitation consult to assist with strengthening/weightbearing, etc   Outcome: Progressing     Problem: DISCHARGE PLANNING  Goal: Discharge to home or other facility with appropriate resources  Description: INTERVENTIONS:  - Identify barriers to discharge w/patient and caregiver  - Arrange for needed discharge resources and transportation as appropriate  - Identify discharge learning needs (meds, wound care, etc )  - Arrange for interpretive services to assist at discharge as needed  - Refer to Case Management Department for coordinating discharge planning if the patient needs post-hospital services based on physician/advanced practitioner order or complex needs related to functional status, cognitive ability, or social support system  Outcome: Progressing     Problem: Knowledge Deficit  Goal: Patient/family/caregiver demonstrates understanding of disease process, treatment plan, medications, and discharge instructions  Description: Complete learning assessment and assess knowledge base    Interventions:  - Provide teaching at level of understanding  - Provide teaching via preferred learning methods  Outcome: Progressing     Problem: GENITOURINARY - ADULT  Goal: Maintains or returns to baseline urinary function  Description: INTERVENTIONS:  - Assess urinary function  - Encourage oral fluids to ensure adequate hydration if ordered  - Administer IV fluids as ordered to ensure adequate hydration  - Administer ordered medications as needed  - Offer frequent toileting  - Follow urinary retention protocol if ordered  Outcome: Progressing  Goal: Absence of urinary retention  Description: INTERVENTIONS:  - Assess patients ability to void and empty bladder  - Monitor I/O  - Bladder scan as needed  - Discuss with physician/AP medications to alleviate retention as needed  - Discuss catheterization for long term situations as appropriate  Outcome: Progressing  Goal: Urinary catheter remains patent  Description: INTERVENTIONS:  - Assess patency of urinary catheter  - If patient has a chronic rosen, consider changing catheter if non-functioning  - Follow guidelines for intermittent irrigation of non-functioning urinary catheter  Outcome: Progressing     Problem: SKIN/TISSUE INTEGRITY - ADULT  Goal: Skin integrity remains intact  Description: INTERVENTIONS  - Identify patients at risk for skin breakdown  - Assess and monitor skin integrity  - Assess and monitor nutrition and hydration status  - Monitor labs (i e  albumin)  - Assess for incontinence   - Turn and reposition patient  - Assist with mobility/ambulation  - Relieve pressure over bony prominences  - Avoid friction and shearing  - Provide appropriate hygiene as needed including keeping skin clean and dry  - Evaluate need for skin moisturizer/barrier cream  - Collaborate with interdisciplinary team (i e  Nutrition, Rehabilitation, etc )   - Patient/family teaching  Outcome: Progressing  Goal: Incision(s), wounds(s) or drain site(s) healing without S/S of infection  Description: INTERVENTIONS  - Assess and document risk factors for skin impairment   - Assess and document dressing, incision, wound bed, drain sites and surrounding tissue  - Consider nutrition services referral as needed  - Oral mucous membranes remain intact  - Provide patient/ family education  Outcome: Progressing  Goal: Oral mucous membranes remain intact  Description: INTERVENTIONS  - Assess oral mucosa and hygiene practices  - Implement preventative oral hygiene regimen  - Implement oral medicated treatments as ordered  - Initiate Nutrition services referral as needed  Outcome: Progressing     Problem: MUSCULOSKELETAL - ADULT  Goal: Maintain or return mobility to safest level of function  Description: INTERVENTIONS:  - Assess patient's ability to carry out ADLs; assess patient's baseline for ADL function and identify physical deficits which impact ability to perform ADLs (bathing, care of mouth/teeth, toileting, grooming, dressing, etc )  - Assess/evaluate cause of self-care deficits   - Assess range of motion  - Assess patient's mobility  - Assess patient's need for assistive devices and provide as appropriate  - Encourage maximum independence but intervene and supervise when necessary  - Involve family in performance of ADLs  - Assess for home care needs following discharge   - Consider OT consult to assist with ADL evaluation and planning for discharge  - Provide patient education as appropriate  Outcome: Progressing  Goal: Maintain proper alignment of affected body part  Description: INTERVENTIONS:  - Support, maintain and protect limb and body alignment  - Provide patient/ family with appropriate education  Outcome: Progressing

## 2021-01-21 NOTE — CASE MANAGEMENT
CM met with pt at bedside to discuss discharge needs  Pt lives in a house with his wife  ADL's are completed independently with no DME use  Pt will need a RW and BSC; CM will order through [facility]  Pt to start with HHPT; CM will send referral to [facility]  Spouse will transport home; hoping to dc today  No other needs expressed or identified  CM will continue to follow as needed  A post acute care recommendation was made by your care team for [facility]  Discussed [place] of Choice with patient  List of agencies given to patient via in person  patient aware the list is custom filtered for them by preference  and that [facility] post acute providers are designated  CM reviewed d/c planning process including the following: identifying help at home, patient preference for d/c planning needs, Discharge Lounge, [facility] Meds to Bed program, availability of treatment team to discuss questions or concerns patient and/or family may have regarding understanding medications and recognizing signs and symptoms once discharged  CM also encouraged patient to follow up with all recommended appointments after discharge  Patient advised of importance for patient and family to participate in managing patients medical well being

## 2021-01-21 NOTE — UTILIZATION REVIEW
Initial Clinical Review    Elective    OP      surgical procedure    Age/Sex: 77 y o  male     Surgery Date:    1/20/21    Procedure: Right total hip arthroplasty, anterior approach    Anesthesia:    choice      POD#1 Progress Note:   1/21    Continue post op care  Continue  PT/OT/WBAT   RLE  Continue  Pain control as  Needed  Required rosen post op, now  D/c, waiting voiding  Required  Urology consult  Admission Orders: Date/Time/Statement:   No orders of the defined types were placed in this encounter  Vital Signs: /51 (BP Location: Right arm)   Pulse 62   Temp 98 5 °F (36 9 °C) (Temporal)   Resp 18   Ht 5' 11" (1 803 m)   Wt 108 kg (238 lb)   SpO2 94%   BMI 33 19 kg/m²      Diet:    regular    Mobility:   PT/OT    DVT Prophylaxis:    SCD'S    Medications/Pain Control:   Scheduled Medications:  aspirin, 325 mg, Oral, BID  docusate sodium, 100 mg, Oral, BID  ferrous sulfate, 325 mg, Oral, Daily With Breakfast  senna, 1 tablet, Oral, Daily      Continuous IV Infusions:  lactated ringers, 50 mL/hr, Intravenous, Continuous  lactated ringers, 100 mL/hr, Intravenous, Continuous  sodium chloride, 100 mL/hr, Intravenous, Continuous      PRN Meds:  acetaminophen, 650 mg, Oral, Q6H PRN  ketorolac, 15 mg, Intravenous, Q6H PRN  lactated ringers, 1,000 mL, Intravenous, Once PRN    And  lactated ringers, 1,000 mL, Intravenous, Once PRN  ondansetron, 4 mg, Intravenous, Q6H PRN  oxyCODONE, 10 mg, Oral, Q6H PRN  oxyCODONE, 5 mg, Oral, Q4H PRN  sodium chloride, 1,000 mL, Intravenous, Once PRN    And  sodium chloride, 1,000 mL, Intravenous, Once PRN        Network Utilization Review Department  ATTENTION: Please call with any questions or concerns to [phone] and carefully listen to the prompts so that you are directed to the right person   All voicemails are confidential   Amy Meza all requests for admission clinical reviews, approved or denied determinations and any other requests to dedicated fax number below belonging to the campus where the patient is receiving treatment   List of dedicated fax numbers for the Facilities:  [address] DENIALS (Administrative/Medical Necessity) [phone]   [address]  (Maternity/NICU/Pediatrics) [phone] [address] Cristina Juarez 2617 (  Catalina Harris "Yanira" 103) [address] [address] Anil Jarquin 1481 [address] [facility]vardL[facility] [place] [address] [phone]

## 2021-01-21 NOTE — PHYSICAL THERAPY NOTE
PHYSICAL THERAPY MIKAYLAAL        Patient Name: Marc Brennan  [id number] Date: 1/21/2021   Time: 9069-0804       01/21/21 0892   PT Last Visit   PT Visit Date 01/21/21   Note Type   Note type Evaluation   Pain Assessment   Pain Assessment Tool 0-10   Pain Score 2   Pain Location/Orientation Orientation: Right;Location: Hip   Patient's Stated Pain Goal No pain   Hospital Pain Intervention(s) Cold applied;Repositioned; Ambulation/increased activity; Emotional support; Rest   Home Living   Type of [address] Two level;Stairs to enter without rails;Bed/bath upstairs;1/2 bath on main level  (2STE, FOS to 2nd floor )   Bathroom Shower/Tub Walk-in shower   Bathroom Toilet Standard   Bathroom Accessibility Accessible   Home Equipment Cane   Additional Comments denies cane use pta    Prior Function   Level of [place] Independent with ADLs and functional mobility   Lives With Spouse   Receives Help From Family   ADL Assistance Independent   IADLs Independent   Falls in the last 6 months 0   Vocational Part time employment   Comments wife and son able to assist pt at home    Restrictions/Precautions   Weight Bearing Precautions Per Order Yes   RLE Weight Bearing Per Order WBAT   Other Precautions Multiple lines;Pain; Fall Risk   General   Additional Pertinent History pt admitted 1/20 for OA of right hip  S/p THR R w/ WBAT RLE   POD #1   Cognition   Overall Cognitive Status WFL   Arousal/Participation Cooperative   Orientation Level Oriented X4   Memory Within functional limits   Following Commands Follows all commands and directions without difficulty   RLE Assessment   RLE Assessment WFL  (grossly 4/5 )   LLE Assessment   LLE Assessment WFL  (grossly 4/5 )   Coordination   Movements are Fluid and Coordinated 1   Sensation X  (pt reports R lateral thigh numbness )   Light Touch   RLE Light Touch Grossly intact   LLE Light Touch Grossly intact   Bed Mobility   Supine to Sit 5  Supervision   Additional items HOB elevated; Bedrails; Increased time required   Transfers   Sit to Stand 5  Supervision   Additional items Increased time required;Verbal cues; Bedrails   Stand to Sit 5  Supervision   Additional items Increased time required;Armrests; Verbal cues   Ambulation/Elevation   Gait pattern Decreased R stance; Forward Flexion;Narrow AURELIA; Excessively slow; Short stride   Gait Assistance 5  Supervision   Additional items Assist x 1;Verbal cues   Assistive Device Rolling walker   Distance 15'x1, 10'x1, 100'x1    Stair Management Assistance 5  Supervision   Additional items Verbal cues; Increased time required   Stair Management Technique Two rails; Step to pattern; Foreward   Number of Stairs 5  (x2  steps )   Balance   Static Sitting Good   Dynamic Sitting Fair +   Static Standing Fair   Dynamic Standing Fair -  (RW )   Ambulatory Fair -  (RW )   Endurance Deficit   Endurance Deficit No   Activity Tolerance   Activity Tolerance Patient tolerated treatment well   Medical Staff Made Aware Nixon Bowens CM    Nurse Made Aware Ginette RN    Assessment   Prognosis Good   Problem List Impaired balance;Decreased mobility;Pain   Assessment Loly Aguilar is a 76 yo male admitted to [facility] on 1/20/21 for primary OA of R hip  S/p THR R w/ WBAT RLE  POD #1  Pta pt independent for I/ADLS, with no AD use for ambulation  PT consulted per pt POC for functional mobility assessment and d/c recommendations  Pt currently functioning at S for bed mobility, transfers and ambulation w/RW  Noted narrow AURELIA, short stride, and slowed speed during ambulation  Able to negotiate stairs at S w/ B/L HR and step-to pattern  Vitals stable throughout session (Stand: 119/67 and post-ambulation: 138/72)  Pt functioning below baseline d/t decreased balance and mobility, and pain   Pt will benefit from continued skilled IP PT to address the above mentioned impairments  in order to maximize recovery and increase functional independence when completing mobility and ADLs  Recommended DME includes RW  Recommended d/c includes return to previous w/ support and HHPT, pending further stair trial practice in PT  End of session pt seated upright in chair, w/ all needs in reach  Barriers to Discharge Inaccessible home environment   Barriers to Discharge Comments 2STE    Goals   Patient Goals to go home    STG Expiration Date 02/04/21   Short Term Goal #1 Pt will function at Mod I for bed mobility  2  Pt will function at Mod I for transfers and ambulation  3  Pt will ambulate >150'x1 w/ least restrictive AD for community/household distances  4  Pt will increase activity tolerance to 45 minutes  5  Pt will negotiate stairs at Mod I for safe d/c home  6  Increase Barthel  by MCID value of 35 to facilitate independence   7  PT for ongoing patient and family/caregiver education, DME needs and d/c planning in order to promote highest level of function in least restrictive environment  PT Treatment Day 0   Plan   Treatment/Interventions Functional transfer training;LE strengthening/ROM; Elevations; Therapeutic exercise; Endurance training;Patient/family training;Equipment eval/education; Bed mobility;Gait training;Continued evaluation;Spoke to nursing;Spoke to case management;OT   PT Frequency 7x/wk; Twice a day;Weekend   Recommendation   PT Discharge Recommendation Home with skilled therapy; Return to previous environment with social support  (HHPT )   Equipment Recommended Mukesh Pollard   PT - OK to Discharge No   Additional Comments pending further stair trial    Modified Liguori Scale   Modified Liguori Scale 3   Barthel Index   Feeding 10   Bathing 0   Grooming Score 5   Dressing Score 5   Bladder Score 10   Bowels Score 10   Toilet Use Score 5   Transfers (Bed/Chair) Score 10   Mobility (Level Surface) Score 10   Stairs Score 5   Barthel Index Score 70     History: co - morbidities, age,  fall risk,  multiple lines, pain ,WBS   Exam: impairments in systems including musculoskeletal (, strength, ), neuromuscular (balance,locomotion, gait, transfers, motor function and learning, LT/sensation), cardiopulmonary, cognition, Barthel Index   Clinical: unstable/unpredictable  Complexity:high    Marko Carcamo, SPT

## 2021-01-21 NOTE — PLAN OF CARE
Problem: PHYSICAL THERAPY ADULT  Goal: Performs mobility at highest level of function for planned discharge setting  See evaluation for individualized goals  Description: Treatment/Interventions: Functional transfer training, LE strengthening/ROM, Elevations, Therapeutic exercise, Endurance training, Patient/family training, Equipment eval/education, Bed mobility, Gait training, Continued evaluation, Spoke to nursing, Spoke to case management, OT  Equipment Recommended: Federico Canales       See flowsheet documentation for full assessment, interventions and recommendations  Outcome: Progressing  Note: Prognosis: Good  Problem List: Decreased strength, Decreased range of motion, Decreased endurance, Impaired balance, Decreased mobility, Decreased skin integrity, Orthopedic restrictions, Pain  Assessment: Pt  supine in bed upon my arrival  Pt  reporting fatigue/pain, however agreeable to therapeutic intervention  Performance of HEP with cues provided for proper completion  Progressed with transfers being able to complete practicing proper technique with no noted LOB  Progressed with increased amb  trial with use of RW and cues provided for LE sequencing  Progressed to stair training, being able to complete practicing proper technique with no noted LOB  Pt  reports good understanding at this time with no questions at this time  Continued with a second amb  trial with return to room and repositioned supine in bed with ice applied at end of treatment session  Pt  has completed all his PT goals and is safe for d/c home with HHPT and family support as needed when medically stable for continued improvement of noted impairments above  Barriers to Discharge: None  Barriers to Discharge Comments: 2STE   PT Discharge Recommendation: Home with skilled therapy, Return to previous environment with social support(HHPT)     PT - OK to Discharge: Yes(if d/c when medically stable )    See flowsheet documentation for full assessment

## 2021-01-21 NOTE — PROGRESS NOTES
Progress Note - Internal Medicine   Andrew Peñaloza 77 y o  male MRN: [id number]  Unit/Bed#: E2 -01 Encounter: [id number]      Impression :    POD #1,  elective right total hip replacement by Dr Perla Parra  Advanced degenerative joint disease, failed conservative treatments  Ambulatory dysfunction  Postoperative indwelling Fernando catheter  History of elevated PSA/ benign prostatic hypertrophy  Obesity  Underlying pulmonary nodules B/L -following Dr Dorys Ulloa from [address]   Right bundle branch block  Recent positive COVID serology-likely due to COVID infection    Recommendation:    · Patient is hemodynamically stable as evident on the flow sheets  Patients  pain is well controlled with current analgesics  · He has steristrip dressings and wound site is good  · Medically stable for discharge with out patient follow ups as arranged by  once cleared by Therapy and Orthopedic team   · Continue DVT prophylaxis as per surgical team   · Monitor for any redness/ drainage / swelling around site of surgery and to notify Orthopedic team or PCP  · Recommend life style modifications and any high impact activities  · Continue PT /OT to improve endurance, range of motion, gait stabilization and use of assist device in a safe manner  · Recheck Hemoglobin and hematocrit  through PCP in 1 week upon discharge  · Full fall and orthostatic precautions  Subjective:     Patient seen and examined today  EMR and overnight events reviewed  Patient is sitting on the side chair in his room and watching television and eating his supper  He has no new complaints  States that he could not sleep very well overnight  He has no chest pain palpitation shortness of breath  Denies any bleeding from his surgical site  Review of Systems     Constitutional: Denies appetite change  No chills, diaphoresis, fatigue or fever  HEENT: No congestion, sore throat   No visual complaints  Respiratory: No cough, chest tightness, shortness of breath and wheezing  Cardiovascular: No chest pain and palpitations  No Syncope or grey out  GI: Negative for abdominal distention, pain, constipation, diarrhea or nausea  Endocrine: Negative for cold or heat intolerance, polydipsia and polyuria  Genitourinary: Negative for decreased urine volume and urgency  Skin: Negative for rash  Neurological: Negative for dizziness, weakness, numbness and headaches  Hematological: Negative for spontaneous bruising or bleeding  Psychiatric: Negative for confusion, dysphoric mood, hallucinations  All other systems reviewed and are negative  OBJECTIVE:     Vitals:     Temp:  [97 °F (36 1 °C)-99 °F (37 2 °C)] 97 2 °F (36 2 °C)  HR:  [54-77] 65  Resp:  [17-20] 18  BP: (105-124)/(51-76) 111/61  SpO2:  [92 %-98 %] 96 %  Temp (24hrs), Av °F (36 7 °C), Min:97 °F (36 1 °C), Max:99 °F (37 2 °C)  Current: Temperature: (!) 97 2 °F (36 2 °C)    Intake/Output Summary (Last 24 hours) at 2021 1618  Last data filed at 2021 1301  Gross per 24 hour   Intake 1690 ml   Output 3975 ml   Net -2285 ml     Body mass index is 33 19 kg/m²  Physical Exam    General Appearance:  Awake,alert, cooperative  Not in distress  Pallor / Icterus/ Cyanosis negative  Oropharynx no thrush  Head:  Normocephalic, atraumatic   Eyes:  No eye redness or discharge  Neck: Supple, no raised JVP  Lungs:   B/L Clear to auscultation, no wheezing  Normal broncho-alveolar air entry  No pleural rubs  Heart:   Regular S1S2, A2P2 normal, no murmur or gallop  Abdomen:   Soft, non-tender,no rebound, bowel sounds+  Musculoskeletal: Extremities no cyanosis or edema  Surgical site on the operated right hip has clean dressing  No discharge or drainage  Mild diminution and limited range of motion   Psych: Normal Affect / No hallucinations or delusions     Neurologic:             Skin:  Endocrine:  Vascular: Awake and alert  Mentation intact  Speech is intact  Facial symmetry is maintained  Muscle strength is 5/5 in all muscle groups except on operated right lower limb which is limited due to recent surgery  Light touch and temperature sensation is maintained  No rashes /purpura  No open wounds  No thyromegaly / no lid lag  Homans sign is negative  B/L Calf are supple and non tender         Labs, Imaging, & Other studies:    All pertinent labs and imaging studies were personally reviewed  Results from last 7 days   Lab Units 01/21/21  0635   WBC Thousand/uL 10 80*   HEMOGLOBIN g/dL 13 0   PLATELETS Thousands/uL 184     Results from last 7 days   Lab Units 01/21/21  0449   POTASSIUM mmol/L 4 0   CHLORIDE mmol/L 105   CO2 mmol/L 23   BUN mg/dL 11   CREATININE mg/dL 0 89   EGFR ml/min/1 73sq m 88   CALCIUM mg/dL 8 4           Current Meds:  Current Facility-Administered Medications   Medication Dose Route Frequency Provider Last Rate Last Admin    acetaminophen (TYLENOL) tablet 650 mg  650 mg Oral Q6H PRN Paul Steel PA-C   650 mg at 01/21/21 0957    aspirin tablet 325 mg  325 mg Oral BID Paul Steel PA-C   325 mg at 01/21/21 5951    docusate sodium (COLACE) capsule 100 mg  100 mg Oral BID Paul Steel PA-C   100 mg at 01/21/21 8770    ferrous sulfate tablet 325 mg  325 mg Oral Daily With Breakfast Paul Steel PA-C   325 mg at 01/21/21 0956    ketorolac (TORADOL) injection 15 mg  15 mg Intravenous Q6H PRN Paul Steel PA-C        lactated ringers bolus 1,000 mL  1,000 mL Intravenous Once PRN Ricardo Do MD        And    lactated ringers bolus 1,000 mL  1,000 mL Intravenous Once PRN Ricardo Do MD        lactated ringers infusion  50 mL/hr Intravenous Continuous Ammy Berry MD        lactated ringers infusion  100 mL/hr Intravenous Continuous Paul Steel PA-C   Stopped at 01/21/21 0954    ondansetron (ZOFRAN) injection 4 mg  4 mg Intravenous Q6H PRN Paul Steel PA-C        oxyCODONE (ROXICODONE) immediate release tablet 10 mg  10 mg Oral Q6H PRN Garold Began, PA-C        oxyCODONE (ROXICODONE) IR tablet 5 mg  5 mg Oral Q4H PRN Garold Began, PA-C        senna (SENOKOT) tablet 8 6 mg  1 tablet Oral Daily Garold Began, PA-C   8 6 mg at 01/21/21 3379    sodium chloride 0 9 % bolus 1,000 mL  1,000 mL Intravenous Once PRN Ken Thapa MD        And    sodium chloride 0 9 % bolus 1,000 mL  1,000 mL Intravenous Once PRN Ken Thapa MD        sodium chloride 0 9 % infusion  100 mL/hr Intravenous Continuous Ken Thapa  mL/hr at 01/20/21 1059 New Bag at 01/20/21 1345     Home Meds:  Medications Prior to Admission   Medication    glucosamine-chondroitin 500-400 MG tablet         Invasive Devices     Peripheral Intravenous Line            Peripheral IV 01/20/21 Left Hand 1 day                VTE Pharmacologic Prophylaxis:  as per Primary Ortho Team   VTE Mechanical Prophylaxis: sequential compression device    Portions of the record may have been created with voice recognition software  Occasional wrong word or "sound a like" substitutions may have occurred due to the inherent limitations of voice recognition software  Read the chart carefully and recognize, using context, where substitutions have occurred

## 2021-01-21 NOTE — PLAN OF CARE
Problem: PHYSICAL THERAPY ADULT  Goal: Performs mobility at highest level of function for planned discharge setting  See evaluation for individualized goals  Description: Treatment/Interventions: Functional transfer training, LE strengthening/ROM, Elevations, Therapeutic exercise, Endurance training, Patient/family training, Equipment eval/education, Bed mobility, Gait training, Continued evaluation, Spoke to nursing, Spoke to case management, OT  Equipment Recommended: Dory Duarte       See flowsheet documentation for full assessment, interventions and recommendations  Note: Prognosis: Good  Problem List: Impaired balance, Decreased mobility, Pain  Assessment: Vaughn Gutierres is a 76 yo male admitted to [facility] on 1/20/21 for primary OA of R hip  S/p THR R w/ WBAT RLE  POD #1  Pta pt independent for I/ADLS, with no AD use for ambulation  PT consulted per pt POC for functional mobility assessment and d/c recommendations  Pt currently functioning at S for bed mobility, transfers and ambulation w/RW  Noted narrow AURELIA, short stride, and slowed speed during ambulation  Able to negotiate stairs at S w/ B/L HR and step-to pattern  Vitals stable throughout session (Stand: 119/67 and post-ambulation: 138/72)  Pt functioning below baseline d/t decreased balance and mobility, and pain  Pt will benefit from continued skilled IP PT to address the above mentioned impairments  in order to maximize recovery and increase functional independence when completing mobility and ADLs  Recommended DME includes RW  Recommended d/c includes return to previous w/ support and HHPT, pending further stair trial practice in PT  End of session pt seated upright in chair, w/ all needs in reach     Barriers to Discharge: Inaccessible home environment  Barriers to Discharge Comments: 2STE   PT Discharge Recommendation: Home with skilled therapy, Return to previous environment with social support(HHPT )     PT - OK to Discharge: No    See flowsheet documentation for full assessment

## 2021-01-21 NOTE — PROGRESS NOTES
Orthopedic Total Hip Progress Note    ASSESSMENT & PLAN:  Status post- RIGHT total hip arthroplasty: Doing well postoperatively  Pain Relief: Patient doing well with pain medications  Comfortable  Has been out of bed and to bathroom on own  Activity: Patient may be WBAT  Weight Bearing: Weight bearing as tollerated    He had a traumatic rosen placed yesterday and is hoping to void this AM now that the catheter  has been removed  LOS: 0 days     SUBJECTIVE:      Systemic or Specific Complaints: No Complaints    OBJECTIVE:  Vital signs in last 24 hours:  Temp:  [97 °F (36 1 °C)-98 7 °F (37 1 °C)] 98 5 °F (36 9 °C)  HR:  [46-81] 62  Resp:  [17-20] 18  BP: (102-128)/(54-78) 108/68  General: alert, appears stated age and cooperative   Neurovascular: Tibial nerve: Intact, Superficial peroneal nerve: Intact and Deep peroneal nerve: Intact  Dorsalis pedis pulse: 2+, Posterior tibial pulse: 2+ and Capillary refill: Normal   Wound: No Erythema   Range of Motion: Normal and As expected post THR   DVT Exam: Negative Natalio's sign  No cords or calf tenderness  No significant calf/ankle edema  Data Review  CBC:   Lab Results   Component Value Date    WBC 10 80 (H) 01/21/2021    RBC 4 49 01/21/2021    HGB 13 0 01/21/2021    HCT 40 2 01/21/2021     01/21/2021       Plan: Mobilize with PT / OT  DVT prophylaxis   D/C Planning for Disposition - Plan to d/c home today if doing well  Plan to start PT at home  [facility] hospital    Urology consulted    Cath inserted by Urology has been in all night until this AM    ?    Brionna Perry  Date: 1/21/2021  Time: 7:58 AM

## 2021-01-21 NOTE — OCCUPATIONAL THERAPY NOTE
Occupational Therapy Evaluation     Patient Name: Ryland Estrada  GXCAK'C Date: 1/21/2021  Problem List  Principal Problem:    Primary localized osteoarthritis of right hip  Active Problems:    Obesity (BMI 30 0-34  9)    Past Medical History  Past Medical History:   Diagnosis Date    Arthritis     b/l hips     R THR today 1/20/2021     Past Surgical History  Past Surgical History:   Procedure Laterality Date    COLONOSCOPY  11/2015    diverticulosis    COLONOSCOPY      MO TOTAL HIP ARTHROPLASTY Right 1/20/2021    Procedure: ARTHROPLASTY HIP TOTAL ANTERIOR;  Surgeon: Jose Vazquez MD;  Location: [facility];  Service: Orthopedics             01/21/21 0825   OT Last Visit   OT Visit Date 01/21/21   Note Type   Note type Evaluation   Restrictions/Precautions   Weight Bearing Precautions Per Order Yes   Other Precautions Multiple lines; Fall Risk;Pain   Pain Assessment   Pain Assessment Tool 0-10   Pain Score 2   Pain Location/Orientation Orientation: Right;Location: Hip   Hospital Pain Intervention(s) Cold applied; Ambulation/increased activity;Repositioned   Home Living   Type of [address] [place] [address] Two level; Able to live on main level with bedroom/bathroom; Performs ADLs on one level   Bathroom Shower/Tub Walk-in shower  (small 3 inch lip)   Bathroom Toilet Standard   Bathroom Equipment Grab bars in [address]   Additional Comments pt w/ no use of DME PTA   Prior Function   Level of [place] Independent with ADLs and functional mobility   Lives With Spouse   Receives Help From Family   ADL Assistance Independent   IADLs Independent   Falls in the last 6 months 0   Vocational Part time employment   Comments pt reports wife is home w/ him and able to assist and son will be in this weekend to assist   Lifestyle   Autonomy per pt independent w/ ADLs, independent w/ functional transfers and mobility w/ no AD, independent w/ IADLs, driving   Reciprocal Relationships spouse   Service to Others works part-time, cryogenics engineering   Intrinsic Gratification traveling   Subjective   Subjective "I am doing good"   ADL   Where Assessed Chair   Eating Assistance 6  Modified independent   Grooming Assistance 5  Supervision/Setup   UB Bathing Assistance 5  Supervision/Setup   LB Bathing Assistance 5  Supervision/Setup   UB Dressing Assistance 5  Supervision/Setup   LB Dressing Assistance 5  Supervision/Setup   LB Dressing Deficit Thread RLE into underwear; Thread LLE into underwear;Pull up over hips   Toileting Assistance  5  Supervision/Setup   Bed Mobility   Additional Comments pt seated EOB upon arrival   Transfers   Sit to Stand 5  Supervision   Additional items Assist x 1; Increased time required;Verbal cues;Armrests; Bedrails  (operated LE positioning)   Stand to Sit 5  Supervision   Additional items Assist x 1; Increased time required;Verbal cues; Bedrails  (operated LE positioning)   Additional Comments cues for hand placement and operated LE positioning   Functional Mobility   Functional Mobility 4  Minimal assistance   Additional Comments assist x1 w/ increased time to complete   Additional items Rolling walker   Balance   Static Sitting Good   Dynamic Sitting Fair +   Static Standing Fair   Dynamic Standing Fair -   Ambulatory Fair -   Activity Tolerance   Activity Tolerance Patient tolerated treatment well;Patient limited by pain   Nurse Made Aware appropriate to see per RN, Ginette   RUE Assessment   RUE Assessment WFL   LUE Assessment   LUE Assessment WFL   Hand Function   Gross Motor Coordination Functional   Fine Motor Coordination Functional   Sensation   Light Touch No apparent deficits   Additional Comments reports slight numbness on R side of thigh   Proprioception   Proprioception No apparent deficits   Vision-Basic Assessment   Current Vision No visual deficits   Vision - Complex Assessment   Ocular Range of Motion [facility]   Acuity Able to read clock/calendar on wall without difficulty   Perception   Inattention/Neglect Appears intact   Cognition   Overall Cognitive Status WFL   Arousal/Participation Alert; Cooperative   Attention Within functional limits   Orientation Level Oriented X4   Memory Within functional limits   Following Commands Follows one step commands without difficulty   Comments pt engages in appropriate conversations   Assessment   Prognosis Good   Assessment Pt is a 77 y o  male seen for OT evaluation s/p admit to [facility] on 1/20/2021 w/ Primary localized osteoarthritis of right hip, s/p R THR anterior approach, WBAT R LE  Comorbidities affecting pt's functional performance at time of assessment include: ambulatory dysfunction, recent COVID infection, pulmonary lesions, DJD, obesity  Personal factors affecting pt at time of IE include: increased pain  Prior to admission, pt was living w/ spouse and reports independent w/ ADLs, independent w/ functional transfers and mobility w/ no AD, independent w/ IADLs  Upon evaluation: Pt requires supervision sit<>stand w/ VCs for hand placement and positioning, contact guard assist w/ RW, supervision LB ADLs (increased time and cues for operated LE dressing first), setup UB ADLs 2* the following deficits impacting occupational performance: increased pain, multiple lines, slightly impaired balance, impaired activity tolerance  Pt to benefit from continued skilled OT tx while in the hospital to address deficits as defined above and maximize level of functional independence w ADL's and functional mobility  Pt appears to be at baseline w/ ADLs, functional transfers and no inpt OT needs warranted at this time  Pt educated on environmental placement of items, removal of throw rugs, use of bag on RW to transport items, pt receptive  From OT standpoint, recommendation at time of d/c would be home w/ family support  Recommend RW and BSC     Goals   Patient Goals "to go home"   Plan   OT Frequency Eval only   Recommendation OT Discharge Recommendation Return to previous environment with social support   Equipment Recommended Bedside commode  (RW)   OT - OK to Discharge   (when medically stable)   Barthel Index   Feeding 10   Bathing 0   Grooming Score 5   Dressing Score 10   Bladder Score 10   Bowels Score 10   Toilet Use Score 5   Transfers (Bed/Chair) Score 10   Mobility (Level Surface) Score 10   Stairs Score 5   Barthel Index Score 75   Modified Jefferson Davis Scale   Modified Jefferson Davis Scale 4     Documentation completed by: Tolu Hurd MS, OTR/L

## 2021-01-22 ENCOUNTER — DOCUMENTATION (OUTPATIENT)
Dept: SOCIAL WORK | Facility: HOSPITAL | Age: 66
End: 2021-01-22

## 2021-01-22 NOTE — DISCHARGE SUMMARY
DISCHARGE SUMMARY      Patient Name: Andrew Owen  Patient MRN: [id number]  Admitting Provider: Fabienne Early MD  Discharging Provider: No att  providers found  Primary Care Physician at Discharge: Shena Mejia [phone]  Admission Date: 1/20/2021       Discharge Date: 1/21/2021    Admission Diagnosis  Primary osteoarthritis of right hip [M16 11]    Discharge Diagnoses  Principal Problem:    Primary localized osteoarthritis of right hip  Active Problems:    Obesity (BMI 30 0-34  9)  Resolved Problems:    * No resolved hospital problems  [facility] Course  Andrew Owen was admitted to [place] [id number]  on 1/20/2021, with diagnosis of osteoarthritis in his Right hip  On the day of admission, he was brought to surgery, successfully underwent a Right hip replacement  He tolerated the procedure well  Following surgery, he was taken to the recovery room, at which time, there was a consult placed for Dr Berlin Polanco for the patient's medical management  After a short stay in the recovery room, he was transferred to the orthopedic floor at which time, he was resumed on his routine home medications per the hospitalist group  On postop day #1, he was able to start some physical therapy and was able to tolerate it well  At this time, he was in stable condition, showing no sign of deep vein thrombosis or pulmonary embolism  Incision was healing well at the time and showed no signs of infection  DISCHARGE DIAGNOSIS: Primary osteoarthritis of right hip [M16 11]  He is now status post Right total hip replacement, which he underwent during the hospital stay  Medications  See after visit summary for reconciled discharge medications provided to patient and family  Allergies  No Known Allergies    Outpatient Follow-Up  No future appointments        Consults   Medical Management - Dr Tejeda Current Results   Component Value Date    HCT 40 2 01/21/2021     Lab Results Component Value Date    CALCIUM 8 4 01/21/2021    K 4 0 01/21/2021    CO2 23 01/21/2021     01/21/2021    BUN 11 01/21/2021    CREATININE 0 89 01/21/2021        Discharge Disposition  home    Operative Procedures Performed  Procedure(s):  ARTHROPLASTY HIP TOTAL ANTERIOR    Discharge Instructions  ASA x 6 weeks for DVT prophylaxis   WBAT  Follow up x 3 weeks in the office  Suture ends trimmed POD #10 to skin level  ?     Miriam Thurman PA-C  4:21 PM, 1/22/2021

## 2021-01-22 NOTE — PROGRESS NOTES
Admission Report at [facility]  [facility] has admitted your patient to [address] [place] [address] service with the following disciplines:      PT  Response needed, please respond via tiger text or call [phone]   Primary focus of home health care R anterior cisco  Patient stated goals of care to be able to not have to use the walker and get mobility back  Anticipated visit pattern and next visit date 1w1  2w2  Significant clinical findings none  Request for additional disciplines none at this time  Request for medication clarification  Making you aware that patient has not picked up the Oxycodone and denies current need reporting that pain has been managed well with Tylenol  Duplicate med order for colace and senna  Is it ok to continue? Request for other order clarification none  Needs follow up physician appointments scheduled ortho and pcp need to be scheduled  Potential barriers to goal achievement none  Other pertinent information Has not had BM since 1 19  Thank you for allowing us to participate in the care of your patient        Jame Manriquez

## 2021-03-10 DIAGNOSIS — Z23 ENCOUNTER FOR IMMUNIZATION: ICD-10-CM

## (undated) DEVICE — POSITIONER HANA TABLE PACK

## (undated) DEVICE — GLOVE INDICATOR PI UNDERGLOVE SZ 8 BLUE

## (undated) DEVICE — 3M™ STERI-STRIP™ REINFORCED ADHESIVE SKIN CLOSURES, R1547, 1/2 IN X 4 IN (12 MM X 100 MM), 6 STRIPS/ENVELOPE: Brand: 3M™ STERI-STRIP™

## (undated) DEVICE — WEBRIL 6 IN UNSTERILE

## (undated) DEVICE — 3000CC GUARDIAN II: Brand: GUARDIAN

## (undated) DEVICE — DRAPE C-ARM X-RAY

## (undated) DEVICE — THE SIMPULSE SOLO SYSTEM WITH ULTREX RETRACTABLE SPLASH SHIELD TIP: Brand: SIMPULSE SOLO

## (undated) DEVICE — OCCLUSIVE GAUZE STRIP,3% BISMUTH TRIBROMOPHENATE IN PETROLATUM BLEND: Brand: XEROFORM

## (undated) DEVICE — PREP SURGICAL PURPREP 26ML

## (undated) DEVICE — COBAN 4 IN STERILE

## (undated) DEVICE — BETHLEHEM TOTAL HIP, KIT: Brand: CARDINAL HEALTH

## (undated) DEVICE — INTENDED FOR TISSUE SEPARATION, AND OTHER PROCEDURES THAT REQUIRE A SHARP SURGICAL BLADE TO PUNCTURE OR CUT.: Brand: BARD-PARKER SAFETY BLADES SIZE 10, STERILE

## (undated) DEVICE — 6617 IOBAN II PATIENT ISOLATION DRAPE 5/BX,4BX/CS: Brand: STERI-DRAPE™ IOBAN™ 2

## (undated) DEVICE — SCD SEQUENTIAL COMPRESSION COMFORT SLEEVE MEDIUM KNEE LENGTH: Brand: KENDALL SCD

## (undated) DEVICE — CHLORAPREP HI-LITE 26ML ORANGE

## (undated) DEVICE — GLOVE INDICATOR PI UNDERGLOVE SZ 7 BLUE

## (undated) DEVICE — CAPIT HIP COP -CERAMIC ON POLY

## (undated) DEVICE — 3M™ MICROFOAM™ TAPE 1528-4: Brand: 3M™ MICROFOAM™

## (undated) DEVICE — GLOVE SRG BIOGEL 8

## (undated) DEVICE — SUT MONOCRYL 3-0 PS-2 27 IN Y427H

## (undated) DEVICE — GLOVE SRG BIOGEL 6.5

## (undated) DEVICE — HOOD: Brand: FLYTE, SURGICOOL

## (undated) DEVICE — BIPOLAR SEALER 23-113-1 AQM 2.3: Brand: AQUAMANTYS™

## (undated) DEVICE — SUT STRATAFIX SPIRAL PDS PLUS 1 CTX 18 IN SXPP1A400

## (undated) DEVICE — FRAZIER SUCTION INSTRUMENT 18 FR W/OBTURATOR, NO CONTROL VENT: Brand: FRAZIER

## (undated) DEVICE — INTENDED FOR TISSUE SEPARATION, AND OTHER PROCEDURES THAT REQUIRE A SHARP SURGICAL BLADE TO PUNCTURE OR CUT.: Brand: BARD-PARKER ® CARBON RIB-BACK BLADES

## (undated) DEVICE — SURGICAL GOWN, XL SMARTSLEEVE: Brand: CONVERTORS

## (undated) DEVICE — PLUMEPEN PRO 10FT

## (undated) DEVICE — SAW BLADE OSCILLATING BRAZOL 167

## (undated) DEVICE — TRAY FOLEY 16FR URIMETER SURESTEP

## (undated) DEVICE — SUT VICRYL 2-0 CT-1 36 IN J945H